# Patient Record
Sex: MALE | Race: WHITE
[De-identification: names, ages, dates, MRNs, and addresses within clinical notes are randomized per-mention and may not be internally consistent; named-entity substitution may affect disease eponyms.]

---

## 2018-04-13 ENCOUNTER — HOSPITAL ENCOUNTER (EMERGENCY)
Dept: HOSPITAL 80 - FED | Age: 28
LOS: 1 days | Discharge: HOME | End: 2018-04-14
Payer: SELF-PAY

## 2018-04-13 DIAGNOSIS — Z23: ICD-10-CM

## 2018-04-13 DIAGNOSIS — S61.411A: Primary | ICD-10-CM
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
## 2018-04-13 LAB — PLATELET # BLD: 297 10^3/UL (ref 150–400)

## 2018-04-13 NOTE — EDPHY
H & P


Time Seen by Provider: 04/13/18 22:59


HPI/ROS: 





CHIEF COMPLAINT: Possible infection right upper extremity  





HISTORY OF PRESENT ILLNESS: 27-year-old immunocompetent male with out-of-date 

tetanus states that a few days ago he sustained an accidental laceration and 

abrasion from a sharp piece of metal to his right hypo thenar eminence and 

noticed lymphangitic streaking extending proximally.  No fractured objects such 

as ceramic or glass.  No fever no chills.  No nausea no vomiting.  No flu-like 

symptoms.  No Loss of functionality or weakness of the upper extremity.











REVIEW OF SYSTEMS:


A ten point review of systems was performed and is negative with the exception 

of the items mentioned in the HPI








PAST MEDICAL & SURGICAL  HISTORY:  No pertinent medical or surgical history .  

No history of immunocompromised or suppressed conditions.   





SOCIAL HISTORY:  Nonsmoker   














************


PHYSICAL EXAM





(Prior to examination, patient consented to physical exam, hands were washed 

and my usual and customary physical exam procedures followed)


1) GENERAL: Well-developed, well-nourished, alert and oriented.  Appears to be 

in no acute distress.  Smiling.


2) HEAD: Normocephalic, atraumatic


3) HEENT: Pupils equal, round, reactive to light bilaterally.  Sclera 

anicteric. 


4) NECK: Full range of motion, no meningeal signs.


5) LUNGS: Clear auscultation bilaterally, no wheezes, no rhonchi, no 

retractions.   


6) HEART: Regular rate and rhythm, no murmur, no heave, no gallop.


7) ABDOMEN: No guarding, no rebound, no focal tenderness


8) MUSCULOSKELETAL:  Right upper extremity:  The patient's right hypo thenar 

eminence he has a granulating laceration abrasion with lymphangitic streaking 

extending to the proximal 1/3 of his volar forearm.  No crepitus.  No 

epitrochlear or axillary adenopathy.  Negative kanavel sign to his fingers.


9) BACK:  No visual or palpable abnormality. 


10) SKIN: No rash, no petechiae. 


11) Psychiatric:  Patient is oriented X 3, there is no agitation.








***************





DIFFERENTIAL DIAGNOSIS:  In no particular include but limited to cellulitis, 

lymphangitis, necrotizing fasciitis 





Smoking Status: Never smoked


Constitutional: 


 Initial Vital Signs











Temperature (C)  36.7 C   04/13/18 22:55


 


Heart Rate  84   04/13/18 22:55


 


Respiratory Rate  18   04/13/18 22:55


 


Blood Pressure  134/85 H  04/13/18 22:55


 


O2 Sat (%)  97   04/13/18 22:55








 











O2 Delivery Mode               Room Air














Allergies/Adverse Reactions: 


 





No Known Allergies Allergy (Unverified 04/13/18 22:58)


 








Home Medications: 














 Medication  Instructions  Recorded


 


Cephalexin [Keflex] 500 mg PO TID 7 Days  cap 04/13/18














MDM/Departure





- MDM


Medications Given: 


 








Discontinued Medications





Diphtheria/Tetanus/Acell Pertussis (Boostrix)  0.5 ml IM .ONCE ONE


   Stop: 04/13/18 23:06


   Last Admin: 04/13/18 23:24 Dose:  0.5 ml


Cefazolin Sodium/Dextrose (Ancef 1 Gm (Premix))  50 mls @ 200 mls/hr IV EDNOW 

ONE


   PRN Reason: Protocol


   Stop: 04/13/18 23:19


   Last Admin: 04/13/18 23:23 Dose:  50 mls





ED Course/Re-evaluation: 





The extent of this patient's erythema has been outlined with a marker.  His 

tetanus has been updated.  He has been given single dose of IV Ancef in the ER.

  He notes no history of chronic skin infections or history of cutaneous MRSA.  

He has immunocompetent.  I do not think that hospital admission is indicated at 

this time.  I think a recheck in 12 hr is appropriate.  He is started on oral 

Keflex.  He has been informed that if he fails outpatient therapy he may 

necessitate admission.  He is agreeable with this plan.  I believe him to have 

decision-making capacity.  Care of patient under supervision of secondary 

supervising physician Dr Anthony with whom I discussed case . 





- Depart


Disposition: Home, Routine, Self-Care


Clinical Impression: 


Laceration of right hand with infection


Qualifiers:


 Encounter type: initial encounter Qualified Code(s): S61.411A - Laceration 

without foreign body of right hand, initial encounter





Condition: Good


Instructions:  Wound Infection (ED)


Additional Instructions: 


Return to the ER if you develop redness, swelling, discharge, warmth to the 

wound, or any other symptoms that concern you.


Prescriptions: 


Cephalexin [Keflex] 500 mg PO TID 7 Days  cap


Referrals: 


Return, to the ER in 12 hr for recheck [Other] - 04/14/18 12:00 pm

## 2018-04-14 ENCOUNTER — HOSPITAL ENCOUNTER (EMERGENCY)
Dept: HOSPITAL 80 - FED | Age: 28
Discharge: HOME | End: 2018-04-14
Payer: MEDICAID

## 2018-04-14 VITALS — SYSTOLIC BLOOD PRESSURE: 150 MMHG | DIASTOLIC BLOOD PRESSURE: 91 MMHG

## 2018-04-14 VITALS — DIASTOLIC BLOOD PRESSURE: 90 MMHG | SYSTOLIC BLOOD PRESSURE: 134 MMHG

## 2018-04-14 DIAGNOSIS — Z48.01: Primary | ICD-10-CM

## 2018-04-14 PROCEDURE — G0463 HOSPITAL OUTPT CLINIC VISIT: HCPCS

## 2018-04-14 NOTE — EDPHY
General


Time Seen by Provider: 04/14/18 15:38


Narrative: 





CHIEF COMPLAINT: 


Wound recheck





HISTORY OF PRESENT ILLNESS: 


Patient presents for re-evaluation of a wound on his right forearm.  He says 

that 2 weeks ago he was camping when he accidentally cut his anterior distal 

right forearm on a camp stove.  He says he irrigated as best as he could and 

dressed with Neosporin.  He had no complaints for the 1st 2 days but then 

developed some redness and possible infection.  He was treating this at home 

until he came here last night just prior to midnight.  He was evaluated and 

treated for a secondarily infected wound with IV antibiotics and sent home with 

oral Keflex.  The cellulitis was marked, timed and labeled by the physician.  

He was asked to come back here for re-evaluation.  He says that he is feeling 

significantly better at this time.  He has minimal pain.  The redness has 

receded.  He has no complaints of fever.  No difficulty bending or 

straightening the wrist or fingers.  No other associated complaints or 

modifying factors.





TIME OF INJURY:


Two weeks prior to arrival





TETANUS STATUS:


Up-to-date





MEDICAL/SURGICAL/SOCIAL HISTORY:


Uncomplicated medical history.





REVIEW OF SYSTEMS:


Ten systems reviewed and are negative unless otherwise noted in the HPI





EXAMINATION


General Appearance:  Alert, no distress


Cardiovascular:  Symmetric radial pulses 2+.  Brisk cap refill the fingers of 

the right hand.


Neurological:  A&O, sensory of the radial, ulnar and median distributions are 

intact on the right upper extremity.  No wrist drop.  Strength of the 

interossei on the right upper extremity is 5/5.


Skin:  Warm and dry.  There is an area of cellulitis next to a healing wound on 

the distal right forearm, near the wrist.  There are markings surrounding 

cellulitis from the previous evening with evidence of receding infection.  No 

palpable fluctuance or abscess.  No necrosis or gangrene.  Minimal warmth.


Extremities:  Nontender, no pedal edema.  Symmetric range of motion of the 

right upper extremities.





DIFFERENTIAL DIAGNOSES:


Including but not limited to cellulitis, secondarily infected wound, 

tenosynovitis, abscess








MDM:


3:35 p.m.


Patient presents for recheck of secondarily infected wound to the distal right 

forearm on the volar aspect.  He was seen here late last night treat with 

antibiotics and discharged home with Keflex.  The wound margins were delineated 

early This morning.  The area of erythema is significantly improved from the 

documented margins.  He has no complaints of pain.  No fever.  No numbness, 

tingling or weakness.  His extremity exam is unremarkable otherwise.  He is 

feeling much better.  I do feel he is stable for discharge home without further 

intervention.  He will continue his antibiotics as prescribed.  Repeat wound 

check tomorrow evening volume here.  Return sooner for any worsening redness, 

warmth, fever, purulence.  He is comfortable this plan and discharged home 

stable condition.





SUPERVISION:


This patient was independently evaluated without direct involvement of or 

examination by the attending physician.


 


ED Precautions: 


Worsening pain. Erythema, edema, cyanosis, pallor, paresthesia or anesthesia.











- History


Smoking Status: Never smoked





- Objective


Vital Signs: 


 Initial Vital Signs











Temperature (C)  98.4 F   04/14/18 14:59


 


Heart Rate  70   04/14/18 14:59


 


Respiratory Rate  16   04/14/18 14:59


 


Blood Pressure  127/87 H  04/14/18 14:59


 


O2 Sat (%)  96   04/14/18 14:59








 











O2 Delivery Mode               Room Air














Allergies/Adverse Reactions: 


 





No Known Allergies Allergy (Unverified 04/14/18 14:58)


 








Home Medications: 














 Medication  Instructions  Recorded


 


Cephalexin [Keflex] 500 mg PO TID 7 Days  cap 04/13/18














Departure





- Departure


Disposition: Home, Routine, Self-Care


Clinical Impression: 


 Wound infection





Condition: Good


Instructions:  Wound Infection (DC), Acute Wounds (ED)


Additional Instructions: 


1. Continue your Keflex as prescribed to completion


2. Return to emergency department for wound re-evaluation in 24 hr.


3. Return sooner for any increased infection, fever, pain


Referrals: 


Physician,Emergency Dept, MD [Medical Doctor] - 1 day without fail